# Patient Record
Sex: FEMALE | Race: BLACK OR AFRICAN AMERICAN | Employment: OTHER | URBAN - METROPOLITAN AREA
[De-identification: names, ages, dates, MRNs, and addresses within clinical notes are randomized per-mention and may not be internally consistent; named-entity substitution may affect disease eponyms.]

---

## 2017-07-29 ENCOUNTER — HOSPITAL ENCOUNTER (EMERGENCY)
Age: 67
Discharge: HOME | End: 2017-07-29
Attending: EMERGENCY MEDICINE
Payer: MEDICARE

## 2017-07-29 ENCOUNTER — APPOINTMENT (OUTPATIENT)
Dept: GENERAL RADIOLOGY | Age: 67
End: 2017-07-29
Attending: EMERGENCY MEDICINE
Payer: MEDICARE

## 2017-07-29 VITALS
TEMPERATURE: 98.4 F | BODY MASS INDEX: 39.7 KG/M2 | HEART RATE: 96 BPM | WEIGHT: 247 LBS | SYSTOLIC BLOOD PRESSURE: 149 MMHG | DIASTOLIC BLOOD PRESSURE: 77 MMHG | OXYGEN SATURATION: 99 % | HEIGHT: 66 IN | RESPIRATION RATE: 14 BRPM

## 2017-07-29 DIAGNOSIS — M79.642 LEFT HAND PAIN: Primary | ICD-10-CM

## 2017-07-29 PROCEDURE — 99282 EMERGENCY DEPT VISIT SF MDM: CPT

## 2017-07-29 PROCEDURE — 73110 X-RAY EXAM OF WRIST: CPT

## 2017-07-29 RX ORDER — RANITIDINE 150 MG/1
150 CAPSULE ORAL 2 TIMES DAILY
COMMUNITY

## 2017-07-29 RX ORDER — METHYLDOPA 250 MG/1
250 TABLET, FILM COATED ORAL 3 TIMES DAILY
COMMUNITY

## 2017-07-29 RX ORDER — TRAMADOL HYDROCHLORIDE 50 MG/1
50 TABLET ORAL
COMMUNITY
End: 2019-07-02

## 2017-07-29 NOTE — DISCHARGE INSTRUCTIONS
Hand Pain: Care Instructions  Your Care Instructions  Common causes of hand pain are overuse and injuries, such as might happen during sports or home repair projects. Everyday wear and tear, especially as you get older, also can cause hand pain. Most minor hand injuries will heal on their own, and home treatment is usually all you need to do. If you have sudden and severe pain, you may need tests and treatment. Follow-up care is a key part of your treatment and safety. Be sure to make and go to all appointments, and call your doctor if you are having problems. Its also a good idea to know your test results and keep a list of the medicines you take. How can you care for yourself at home? · Take pain medicines exactly as directed. ¨ If the doctor gave you a prescription medicine for pain, take it as prescribed. ¨ If you are not taking a prescription pain medicine, ask your doctor if you can take an over-the-counter medicine. · Rest and protect your hand. Take a break from any activity that may cause pain. · Put ice or a cold pack on your hand for 10 to 20 minutes at a time. Put a thin cloth between the ice and your skin. · Prop up the sore hand on a pillow when you ice it or anytime you sit or lie down during the next 3 days. Try to keep it above the level of your heart. This will help reduce swelling. · If your doctor recommends a sling, splint, or elastic bandage to support your hand, wear it as directed. When should you call for help? Call 911 anytime you think you may need emergency care. For example, call if:  · Your hand turns cool or pale or changes color. Call your doctor now or seek immediate medical care if:  · You cannot move your hand. · Your hand pops, moves out of its normal position, and then returns to its normal position. · You have signs of infection, such as:  ¨ Increased pain, swelling, warmth, or redness. ¨ Red streaks leading from the sore area.   ¨ Pus draining from a place on your hand. ¨ A fever. · Your hand feels numb or tingly. Watch closely for changes in your health, and be sure to contact your doctor if:  · Your hand feels unstable when you try to use it. · You do not get better as expected. · You have any new symptoms, such as swelling. · Bruises from an injury to your hand last longer than 2 weeks. Where can you learn more? Go to http://sudhakar-dae.info/. Enter R273 in the search box to learn more about \"Hand Pain: Care Instructions. \"  Current as of: March 20, 2017  Content Version: 11.3  © 3399-5300 [employer]. Care instructions adapted under license by [employer] (which disclaims liability or warranty for this information). If you have questions about a medical condition or this instruction, always ask your healthcare professional. [address] any warranty or liability for your use of this information.

## 2017-07-29 NOTE — ED NOTES
Patient able to ambulate independently to a chair from CEU to RW, sitting in a recliner on her phone

## 2017-07-29 NOTE — ED PROVIDER NOTES
HPI Comments: 12:32 PM Hazel Liang is a 69 y.o. female with a hx of diabetes, HTN, COPD, PAD, arthritis, and chronic pain who presents to the ED c/o left wrist pain for the past week. The pain has worsened since onset and now radiates into her hand and up her forearm. She has tried wearing a hand brace with no relief. She is concerned because she thought she could feel a \"knot\" on her wrist. She has seen Dr. Laura Lee, a hand specialist, in the past. She has an appointment with him 08/28/17. She is currently followed by Dr. Ruben Mccoy for her chronic back pain for which she takes Tramadol 50mg. She denies fever, trauma, numbness, and any further complaints. The history is provided by the patient. Past Medical History:   Diagnosis Date    Chronic obstructive pulmonary disease ([address]     Chronic pain     Diabetes ([address]     Hypertension     PAD (peripheral artery disease) ([address]     Sleep apnea        Past Surgical History:   Procedure Laterality Date    HX BREAST LUMPECTOMY Right 2014    HX CATARACT REMOVAL Bilateral 2015    HX HERNIA REPAIR  1998    HX HYSTERECTOMY  1983    [address] [facility]         History reviewed. No pertinent family history. Social History     Social History    Marital status: LEGALLY      Spouse name: N/A    Number of children: N/A    Years of education: N/A     Occupational History    Not on file. Social History Main Topics    Smoking status: Former Smoker     Packs/day: 2.00     Years: 25.00     Quit date: 11/1/1996    Smokeless tobacco: Never Used    Alcohol use No    Drug use: 3.00 per week     Special: Marijuana      Comment: 1989    Sexual activity: Not on file     Other Topics Concern    Not on file     Social History Narrative         ALLERGIES: Review of patient's allergies indicates no known allergies. Review of Systems   Constitutional: Negative for diaphoresis and fever. HENT: Negative for congestion and sore throat.     Eyes: Negative for pain and itching. Respiratory: Negative for cough and shortness of breath. Cardiovascular: Negative for chest pain and palpitations. Gastrointestinal: Negative for abdominal pain and diarrhea. Endocrine: Negative for polydipsia and polyuria. Genitourinary: Negative for dysuria and hematuria. Musculoskeletal: Positive for arthralgias (Left wrist pain). Negative for myalgias. Skin: Negative for rash and wound. Neurological: Negative for seizures and syncope. Hematological: Does not bruise/bleed easily. Psychiatric/Behavioral: Negative for agitation and hallucinations. Vitals:    07/29/17 1233   BP: 149/77   Pulse: 96   Resp: 14   Temp: 98.4 °F (36.9 °C)   SpO2: 99%   Weight: 112 kg (247 lb)   Height: 5' 6\" (1.676 m)            Physical Exam   Constitutional: She appears well-developed and well-nourished. HENT:   Head: Normocephalic and atraumatic. Eyes: Conjunctivae are normal. No scleral icterus. Neck: Normal range of motion. Neck supple. No JVD present. Cardiovascular: Normal rate, regular rhythm and normal heart sounds. 4 intact extremity pulses   Pulmonary/Chest: Effort normal and breath sounds normal.   Abdominal: Soft. Bowel sounds are normal.   Musculoskeletal: Normal range of motion. ventral radial aspect of the left wrist with a 2cm area of swelling, soft. NVI. ROM normal   Lymphadenopathy:     She has no cervical adenopathy. Neurological: She is alert. Skin: Skin is warm and dry. Nursing note and vitals reviewed. MDM  Number of Diagnoses or Management Options    ED Course   Doubt fracture, doubt sprain. No hx of trauma. Possibly ganglion cyst.  Checked , scheduled scripts for tramadol, appears to be in pain management. Discussed with pt, will not prescribe narcotic.      Procedures    Vitals:  Patient Vitals for the past 12 hrs:   Temp Pulse Resp BP SpO2   07/29/17 1233 98.4 °F (36.9 °C) 96 14 149/77 99 %     Pulse ox reviewed and WNL    Medications ordered:   Medications - No data to display      Lab findings:  No results found for this or any previous visit (from the past 12 hour(s)). EKG interpretation by ED Physician:      X-Ray, CT or other radiology findings or impressions:  XR WRIST LT AP/LAT/OBL MIN 3V   Final Result      Impression:     1. No evidence for acute fracture or dislocation. 2. Possible small loose body along the posterior radiocarpal joint. Progress notes, Consult notes or additional Procedure notes:   1:07 PM nurse reported to me that pt slipped off of a rolling stool in the treatment area. I saw and eval, has some right hip pain, bares weight well, no limited ROM, no distress. Don't suspect hip fx or other serious injury from her fall. Did not hit head. No fracture of left wrist. D/w pt    Disposition:  Diagnosis:   1. Left hand pain        Disposition: +home    Follow-up Information     Follow up With Details Comments [facility] MD Cristobal In 2 days  [place] [id number]  [address]  [phone]             Patient's Medications   Start Taking    No medications on file   Continue Taking    AMLODIPINE (NORVASC) 10 MG TABLET        ATORVASTATIN (LIPITOR) 40 MG TABLET        CLOPIDOGREL (PLAVIX) 75 MG TABLET        METFORMIN ER (GLUCOPHAGE XR) 750 MG TABLET        METHYLDOPA (ALDOMET) 250 MG TABLET    Take 250 mg by mouth three (3) times daily. NEXIUM 40 MG CAPSULE        OTHER        PAROXETINE (PAXIL) 10 MG TABLET        PROAIR HFA 90 MCG/ACTUATION INHALER        RANITIDINE  MG CAPSULE    Take 150 mg by mouth two (2) times a day. SPIRIVA WITH HANDIHALER 18 MCG INHALATION CAPSULE        TIZANIDINE (ZANAFLEX) 4 MG TABLET    1 tab po 3 to 4 times daily as needed for back spasm    TRAMADOL (ULTRAM) 50 MG TABLET    Take 50 mg by mouth every six (6) hours as needed for Pain.     ZETIA 10 MG TABLET       These Medications have changed    No medications on file   Stop Taking    CLOTRIMAZOLE (LOTRIMIN) 1 % TOPICAL CREAM        FUROSEMIDE (LASIX) 40 MG TABLET        GABAPENTIN (NEURONTIN) 300 MG CAPSULE        LOSARTAN (COZAAR) 100 MG TABLET        NAPROXEN (NAPROSYN) 375 MG TABLET    Take 1 Tab by mouth two (2) times daily (with meals). For back pain         SCRIBE ATTESTATION STATEMENT  Documented by: Ame Garcia scribing for, and in the presence of, Megan Jones MD 2:48 PM     Signed by: Lalita Al, 07/29/17 2:48 PM     PROVIDER ATTESTATION STATEMENT  I personally performed the services described in the documentation, reviewed the documentation, as recorded by the scribe in my presence, and it accurately and completely records my words and actions.   Megan Jones MD

## 2017-07-29 NOTE — ED NOTES
Per xray tech patient was seated on the stool and slid off, landing on her r hip, no LOC, no dizziness.  Provider made aware    Xray tech will fill out a [employer]

## 2019-07-01 ENCOUNTER — HOSPITAL ENCOUNTER (EMERGENCY)
Age: 69
Discharge: HOME | End: 2019-07-01
Attending: EMERGENCY MEDICINE | Admitting: EMERGENCY MEDICINE
Payer: MEDICARE

## 2019-07-01 VITALS
RESPIRATION RATE: 18 BRPM | DIASTOLIC BLOOD PRESSURE: 91 MMHG | HEART RATE: 83 BPM | TEMPERATURE: 97.5 F | WEIGHT: 260 LBS | OXYGEN SATURATION: 99 % | HEIGHT: 66 IN | BODY MASS INDEX: 41.78 KG/M2 | SYSTOLIC BLOOD PRESSURE: 190 MMHG

## 2019-07-01 DIAGNOSIS — K08.89 TOOTHACHE: Primary | ICD-10-CM

## 2019-07-01 PROCEDURE — 99282 EMERGENCY DEPT VISIT SF MDM: CPT

## 2019-07-01 PROCEDURE — 75810000283 HC INJECTION NERVE BLOCK

## 2019-07-01 PROCEDURE — 74011000250 HC RX REV CODE- 250: Performed by: EMERGENCY MEDICINE

## 2019-07-01 RX ORDER — PENICILLIN V POTASSIUM 500 MG/1
500 TABLET, FILM COATED ORAL 4 TIMES DAILY
Qty: 28 TAB | Refills: 0 | Status: SHIPPED | OUTPATIENT
Start: 2019-07-01 | End: 2019-07-08

## 2019-07-01 RX ORDER — BUPIVACAINE HYDROCHLORIDE 5 MG/ML
5 INJECTION, SOLUTION EPIDURAL; INTRACAUDAL ONCE
Status: COMPLETED | OUTPATIENT
Start: 2019-07-01 | End: 2019-07-01

## 2019-07-01 RX ADMIN — BUPIVACAINE HYDROCHLORIDE 25 MG: 5 INJECTION, SOLUTION EPIDURAL; INTRACAUDAL at 04:36

## 2019-07-01 NOTE — DISCHARGE INSTRUCTIONS
Patient Education        Tooth and Gum Pain: Care Instructions  Your Care Instructions    The most common causes of dental pain are tooth decay and gum disease. Pain can also be caused by an infection of the tooth (abscess) or the gums. Or you may have pain from a broken or cracked tooth. Other causes of pain include infection and damage to a tooth from nervous grinding of your teeth. A wisdom tooth can be painful when it is coming in but cannot break through the gum. It can also be painful when the tooth is only partway in and extra gum tissue has formed around it. The tissue can get inflamed (pericoronitis), and sometimes it gets infected. Prompt dental care can help find the cause of your toothache and keep the tooth from dying or gum disease from getting worse. Self-care at home may reduce your pain and discomfort. Follow-up care is a key part of your treatment and safety. Be sure to make and go to all appointments, and call your dentist or doctor if you are having problems. It's also a good idea to know your test results and keep a list of the medicines you take. How can you care for yourself at home? · To reduce pain and facial swelling, put an ice or cold pack on the outside of your cheek for 10 to 20 minutes at a time. Put a thin cloth between the ice and your skin. Do not use heat. · If your doctor prescribed antibiotics, take them as directed. Do not stop taking them just because you feel better. You need to take the full course of antibiotics. · Ask your doctor if you can take an over-the-counter pain medicine, such as acetaminophen (Tylenol), ibuprofen (Advil, Motrin), or naproxen (Aleve). Be safe with medicines. Read and follow all instructions on the label. · Avoid very hot, cold, or sweet foods and drinks if they increase your pain. · Rinse your mouth with warm salt water every 2 hours to help relieve pain and swelling. Mix 1 teaspoon of salt in 8 ounces of water.   · Talk to your dentist about using special toothpaste for sensitive teeth. To reduce pain on contact with heat or cold or when brushing, brush with this toothpaste regularly or rub a small amount of the paste on the sensitive area with a clean finger 2 or 3 times a day. Floss gently between your teeth. · Do not smoke or use spit tobacco. Tobacco use can make gum problems worse, decreases your ability to fight infection in your gums, and delays healing. If you need help quitting, talk to your doctor about stop-smoking programs and medicines. These can increase your chances of quitting for good. When should you call for help? Call 911 anytime you think you may need emergency care. For example, call if:    · You have trouble breathing.    Call your dentist or doctor now or seek immediate medical care if:    · You have signs of infection, such as:  ? Increased pain, swelling, warmth, or redness. ? Red streaks leading from the area. ? Pus draining from the area. ? A fever.    Watch closely for changes in your health, and be sure to contact your doctor if:    · You do not get better as expected. Where can you learn more? Go to http://sudhakar-dae.info/. Enter 0363 [id number] in the search box to learn more about \"Tooth and Gum Pain: Care Instructions. \"  Current as of: March 27, 2018  Content Version: 11.9  © 2217-8187 [employer]. Care instructions adapted under license by [employer] (which disclaims liability or warranty for this information). If you have questions about a medical condition or this instruction, always ask your healthcare professional. [place] [address] any warranty or liability for your use of this information.

## 2019-07-01 NOTE — ED TRIAGE NOTES
\"my mouth hurts, I can't even sleep\"  Bottom left, back. Had a tooth pulled because it was broken off. Tooth was extracted on 5/30/19.   Pain started becoming constant over this week

## 2019-07-01 NOTE — ED NOTES
I have reviewed discharge instructions with the patient. The patient verbalized understanding. Patient armband removed and shredded    Patient ambulatory to ED Lobby.

## 2019-07-01 NOTE — ED PROVIDER NOTES
67 yo AAF with no relevant PMHx presents with 1 month h/o intermittent left toothache. Pt states that he had cap on one of left bottom teeth, which came off and eventually led to tooth being extracted about 1 month ago. Since that point, pt has had some intermittent pain to area. Pt had been taking ibuprofen and tylenol with some relief but feels like meds are not working anymore. No fevers, no vomiting, no other symptoms. Past Medical History:   Diagnosis Date    [facility] disease [facility]     Chronic pain     Diabetes ([facility]     Hypertension     PAD (peripheral artery disease) ([facility]     Sleep apnea        Past Surgical History:   Procedure Laterality Date    HX BREAST LUMPECTOMY Right 2014    HX CATARACT REMOVAL Bilateral 2015    HX HERNIA REPAIR      HX HYSTERECTOMY      [facility]         History reviewed. No pertinent family history.     Social History     Socioeconomic History    Marital status:      Spouse name: Not on file    Number of children: Not on file    Years of education: Not on file    Highest education level: Not on file   Occupational History    Not on file   Social Needs    Financial resource strain: Not on file    Food insecurity:     Worry: Not on file     Inability: Not on file    Transportation needs:     Medical: Not on file     Non-medical: Not on file   Tobacco Use    Smoking status: Former Smoker     Packs/day: 2.00     Years: 25.00     Pack years: 50.00     Last attempt to quit: 1996     Years since quittin.6    Smokeless tobacco: Never Used   Substance and Sexual Activity    Alcohol use: No    Drug use: Yes     Frequency: 3.0 times per week     Types: Marijuana     Comment:     Sexual activity: Not on file   Lifestyle    Physical activity:     Days per week: Not on file     Minutes per session: Not on file    Stress: Not on file   Relationships    Social connections:     Talks on phone: Not on file     Gets together: Not on file     Attends Protestant service: Not on file     Active member of club or organization: Not on file     Attends meetings of clubs or organizations: Not on file     Relationship status: Not on file    Intimate partner violence:     Fear of current or ex partner: Not on file     Emotionally abused: Not on file     Physically abused: Not on file     Forced sexual activity: Not on file   Other Topics Concern    Not on file   Social History Narrative    Not on file         ALLERGIES: Patient has no known allergies. Review of Systems   Constitutional: Negative for fever. HENT: Positive for dental problem. Negative for trouble swallowing. Respiratory: Negative for shortness of breath. Cardiovascular: Negative for chest pain. Gastrointestinal: Negative for abdominal pain, diarrhea and vomiting. Genitourinary: Negative for difficulty urinating. Musculoskeletal: Negative for back pain and neck pain. Skin: Negative for wound. Neurological: Negative for syncope. Psychiatric/Behavioral: Negative for behavioral problems. All other systems reviewed and are negative. Vitals:    07/01/19 0331   BP: (!) 190/91   Pulse: 83   Resp: 18   Temp: 97.5 °F (36.4 °C)   SpO2: 99%   Weight: 117.9 kg (260 lb)   Height: 5' 6\" (1.676 m)            Physical Exam   Constitutional: She is oriented to person, place, and time. She appears well-developed. No distress. well-appearing, nad   HENT:   Head: Normocephalic and atraumatic. Missing teeth, no apparent abscess, no drooling, no trismus   Eyes: EOM are normal.   Neck: Normal range of motion. Cardiovascular: Normal rate. Pulmonary/Chest: Effort normal and breath sounds normal. No respiratory distress. Abdominal: Soft. There is no tenderness. Musculoskeletal: Normal range of motion. Mechanically stable   Neurological: She is alert and oriented to person, place, and time. No focal deficits noted   Skin: Skin is warm. Psychiatric: Her behavior is normal.   Nursing note and vitals reviewed. MDM  Number of Diagnoses or Management Options  Toothache:   Diagnosis management comments: 69 yo AAF with no relevant PMHx presents with 1 month h/o toothache to lower left side. No other symptoms or complaints. Examination with missing teeth but otherwise unremarkable with no visible abscess, no drooling, no trismus. I do not suspect emergent medical condition clinically. Dental block in ED, dc home, trial of abx, follow-up, return precautions. Patient Progress  Patient progress: stable         Nerve Block  Date/Time: 7/1/2019 4:31 AM  Performed by: Angella Escobar MD  Authorized by: Angella Escobar MD     Consent:     Consent obtained:  Verbal    Consent given by:  Patient    Risks discussed:  Unsuccessful block and pain    Alternatives discussed:  No treatment and alternative treatment  Indications:     Indications:  Pain relief  Location:     Body area:  Head    Head nerve blocked: left inferior alveolar. Laterality:  Left  Procedure details (see MAR for exact dosages): Block needle gauge:  25 G    Anesthetic injected:  Bupivacaine 0.5% w/o epi    Injection procedure:  Anatomic landmarks identified, anatomic landmarks palpated, introduced needle, incremental injection and negative aspiration for blood    Paresthesia:  None  Post-procedure details:     Outcome:  Pain improved    Patient tolerance of procedure: Tolerated well, no immediate complications        PROGRESS NOTES    3:45 AM:   Angella Escobar MD arrives to the bedside to evaluate the patient. Answered the patient's questions regarding the treatment plan.       CONSULTATIONS  None      MEDICATIONS ORDERED  Medications   bupivacaine (PF) (MARCAINE) 0.5 % (5 mg/mL) injection 25 mg (has no administration in time range)       RADIOLOGY INTERPRETATIONS  No orders to display       EKG READINGS/LABORATORY RESULTS  No results found for this or any previous visit (from the past 12 hour(s)). ED DIAGNOSIS & DISPOSITION INFORMATION  Diagnosis:   1. Toothache        Disposition: Discharged    Follow-up Information     Follow up With Specialties Details Why Contact Info    [address]  Schedule an appointment as soon as possible for a visit  [address]  [phone]    [facility] EMERGENCY DEPT Emergency Medicine  As needed [address]  [phone]          Patient's Medications   Start Taking    PENICILLIN V POTASSIUM (VEETID) 500 MG TABLET    Take 1 Tab by mouth four (4) times daily for 7 days. Continue Taking    AMLODIPINE (NORVASC) 10 MG TABLET        ATORVASTATIN (LIPITOR) 40 MG TABLET        CLOPIDOGREL (PLAVIX) 75 MG TABLET        METFORMIN ER (GLUCOPHAGE XR) 750 MG TABLET        METHYLDOPA (ALDOMET) 250 MG TABLET    Take 250 mg by mouth three (3) times daily. NEXIUM 40 MG CAPSULE        OTHER        PAROXETINE (PAXIL) 10 MG TABLET        PROAIR HFA 90 MCG/ACTUATION INHALER        RANITIDINE  MG CAPSULE    Take 150 mg by mouth two (2) times a day. SPIRIVA WITH HANDIHALER 18 MCG INHALATION CAPSULE        TIZANIDINE (ZANAFLEX) 4 MG TABLET    1 tab po 3 to 4 times daily as needed for back spasm    TRAMADOL (ULTRAM) 50 MG TABLET    Take 50 mg by mouth every six (6) hours as needed for Pain.     ZETIA 10 MG TABLET       These Medications have changed    No medications on file   Stop Taking    No medications on file           Zoila Bone MD.

## 2019-07-02 ENCOUNTER — HOSPITAL ENCOUNTER (EMERGENCY)
Age: 69
Discharge: HOME | End: 2019-07-02
Attending: EMERGENCY MEDICINE
Payer: MEDICARE

## 2019-07-02 VITALS
DIASTOLIC BLOOD PRESSURE: 82 MMHG | BODY MASS INDEX: 38.57 KG/M2 | OXYGEN SATURATION: 99 % | TEMPERATURE: 99.2 F | WEIGHT: 240 LBS | HEART RATE: 81 BPM | HEIGHT: 66 IN | RESPIRATION RATE: 17 BRPM | SYSTOLIC BLOOD PRESSURE: 152 MMHG

## 2019-07-02 DIAGNOSIS — K02.9 PAIN DUE TO DENTAL CARIES: Primary | ICD-10-CM

## 2019-07-02 PROCEDURE — 99282 EMERGENCY DEPT VISIT SF MDM: CPT

## 2019-07-02 RX ORDER — TRAMADOL HYDROCHLORIDE 50 MG/1
50 TABLET ORAL
Qty: 10 TAB | Refills: 0 | Status: SHIPPED | OUTPATIENT
Start: 2019-07-02 | End: 2019-07-05

## 2019-07-02 NOTE — DISCHARGE INSTRUCTIONS
Patient Education        Tooth Decay: Care Instructions  Your Care Instructions    Tooth decay is damage to a tooth caused by plaque. Plaque is a thin film of bacteria that sticks to the teeth above and below the gum line. If plaque isn't removed from the teeth, it can build up and harden into tartar. The bacteria in plaque and tartar use sugars in food to make acids. These acids can cause tooth decay and gum disease. Any part of your tooth can decay, from the roots below the gum line to the chewing surface. Decay can affect the outer layer (enamel) or inner layer (dentin) of your teeth. The deeper the decay, the worse the damage. Untreated tooth decay will get worse and may lead to tooth loss. If you have a small hole (cavity) in your tooth, your dentist can repair it by removing the decay and filling the hole. If you have deeper decay, you may need more treatment. A very badly damaged tooth may have to be removed. Follow-up care is a key part of your treatment and safety. Be sure to make and go to all appointments, and call your dentist if you are having problems. It's also a good idea to know your test results and keep a list of the medicines you take. How can you care for yourself at home? If you have pain:  · Take an over-the-counter pain medicine, such as acetaminophen (Tylenol), ibuprofen (Advil, Motrin), or naproxen (Aleve). Be safe with medicines. Read and follow all instructions on the label. ? Do not take two or more pain medicines at the same time unless the doctor told you to. Many pain medicines have acetaminophen, which is Tylenol. Too much acetaminophen (Tylenol) can be harmful. · Put ice or a cold pack on your cheek over the tooth for 10 to 15 minutes at a time. Put a thin cloth between the ice and your skin. To prevent tooth decay  · Brush teeth twice a day, and floss once a day. Brushing with fluoride toothpaste and flossing may be enough to reverse early decay.   · Use a toothbrush with soft, rounded-end bristles and a head that is small enough to reach all parts of your teeth and mouth. Replace your toothbrush every 3 or 4 months. You may also use an electric toothbrush that has rotating and oscillating (back-and-forth) action. · Ask your dentist about having fluoride treatments at the dental office. · Brush your tongue to help get rid of bacteria. · Eat healthy foods that include whole grains, vegetables, and fruits. · Have your teeth cleaned by a professional at least two times a year. · Do not smoke or use smokeless tobacco. Tobacco can make tooth decay worse. When should you call for help? Call 911 anytime you think you may need emergency care. For example, call if:    · You have trouble breathing.    Call your dentist now or seek immediate medical care if:    · You have new or worse symptoms of infection, such as:  ? Increased pain, swelling, warmth, or redness. ? Red streaks leading from the area. ? Pus draining from the area. ? A fever.    Watch closely for changes in your health, and be sure to contact your doctor if:    · You do not get better as expected. Where can you learn more? Go to http://sudhakar-dae.info/. Enter Q335 in the search box to learn more about \"Tooth Decay: Care Instructions. \"  Current as of: March 27, 2018  Content Version: 11.9  © 8694-9344 "[employer]. Care instructions adapted under license by [employer] (which disclaims liability or warranty for this information). If you have questions about a medical condition or this instruction, always ask your healthcare professional. [place] [address] any warranty or liability for your use of this information.

## 2019-07-02 NOTE — ED PROVIDER NOTES
EMERGENCY DEPARTMENT HISTORY AND PHYSICAL EXAM    3:33 PM      Date: 7/2/2019  Patient Name: Amaya Maldonado    History of Presenting Illness     Chief Complaint   Patient presents with    Dental Pain         History Provided By: Patient    Chief Complaint: dental pain       Additional History (Context): Amaya Maldonado is a 68 y.o. female who presents with dental pain. Left lower jaw dental pain that started 1 month ago and has been intermittent, worse this week. She went to the ER yesterday had dental block but was not prescribed any pain medication. She has been taking the penicillin for 1 day. She did follow-up with [facility] but they do not have an appointment until next week. She cannot afford to go to another dentist.  Denies fevers, throat swelling or trouble breathing peer pain is currently 9 out of 10 and she has been taking Tylenol for the pain. PCP: UNKNOWN    Current Outpatient Medications   Medication Sig Dispense Refill    traMADol (ULTRAM) 50 mg tablet Take 1 Tab by mouth every six (6) hours as needed for Pain for up to 3 days. Max Daily Amount: 200 mg. 10 Tab 0    penicillin v potassium (VEETID) 500 mg tablet Take 1 Tab by mouth four (4) times daily for 7 days. 28 Tab 0    raNITIdine hcl 150 mg capsule Take 150 mg by mouth two (2) times a day.  methyldopa (ALDOMET) 250 mg tablet Take 250 mg by mouth three (3) times daily.       OTHER       tiZANidine (ZANAFLEX) 4 mg tablet 1 tab po 3 to 4 times daily as needed for back spasm 30 Tab 0    PROAIR HFA 90 mcg/actuation inhaler   3    amLODIPine (NORVASC) 10 mg tablet   1    atorvastatin (LIPITOR) 40 mg tablet   1    clopidogrel (PLAVIX) 75 mg tablet   1    NEXIUM 40 mg capsule   3    ZETIA 10 mg tablet   1    metFORMIN ER (GLUCOPHAGE XR) 750 mg tablet   1    PARoxetine (PAXIL) 10 mg tablet   1    SPIRIVA WITH HANDIHALER 18 mcg inhalation capsule   1       Past History     Past Medical History:  Past Medical History: Diagnosis Date    [facility]     Chronic pain     Diabetes ([facility]     Hypertension     [facility]     Sleep apnea        Past Surgical History:  Past Surgical History:   Procedure Laterality Date    HX BREAST LUMPECTOMY Right 2014    HX CATARACT REMOVAL Bilateral 2015    HX HERNIA REPAIR  1998    HX HYSTERECTOMY      HX TUBAL LIGATION  1985       Family History:  No family history on file. Social History:  Social History     Tobacco Use    Smoking status: Former Smoker     Packs/day: 2.00     Years: 25.00     Pack years: 50.00     Last attempt to quit: 1996     Years since quittin.6    Smokeless tobacco: Never Used   Substance Use Topics    Alcohol use: No    Drug use: Yes     Frequency: 3.0 times per week     Types: Marijuana     Comment:        Allergies:  No Known Allergies      Review of Systems       Review of Systems   Constitutional: Negative for fever. HENT: Positive for dental problem. Negative for facial swelling. Eyes: Negative for visual disturbance. Respiratory: Negative for shortness of breath. Cardiovascular: Negative for chest pain. Gastrointestinal: Negative for abdominal pain. Genitourinary: Negative for dysuria. Musculoskeletal: Negative for neck pain. Skin: Negative for rash. Neurological: Negative for dizziness. Psychiatric/Behavioral: Negative for confusion. All other systems reviewed and are negative. Physical Exam     Visit Vitals  /82 (BP 1 Location: Right arm, BP Patient Position: At rest)   Pulse 81   Temp 99.2 °F (37.3 °C)   Resp 17   Ht 5' 6\" (1.676 m)   Wt 108.9 kg (240 lb)   SpO2 99%   BMI 38.74 kg/m²         Physical Exam   Constitutional: She is oriented to person, place, and time. She appears well-developed and well-nourished. No distress. HENT:   Head: Normocephalic and atraumatic. Decay of tooth #18, prior extraction tooth #19.   No gingival swelling or palpable fluctuance to suggest abscess. Airway patent without edema. Eyes: Conjunctivae are normal.   Neck: Normal range of motion. Cardiovascular: Normal rate and regular rhythm. Pulmonary/Chest: Effort normal.   Abdominal: She exhibits no distension. Musculoskeletal: Normal range of motion. Neurological: She is alert and oriented to person, place, and time. Skin: Skin is warm and dry. She is not diaphoretic. Psychiatric: She has a normal mood and affect. Nursing note and vitals reviewed. Diagnostic Study Results     Labs -  No results found for this or any previous visit (from the past 12 hour(s)). Radiologic Studies -   No orders to display         Medical Decision Making   I am the first provider for this patient. I reviewed the vital signs, available nursing notes, past medical history, past surgical history, family history and social history. Vital Signs-Reviewed the patient's vital signs. Records Reviewed: Nursing Notes (Time of Review: 3:33 PM)    ED Course: Progress Notes, Reevaluation, and Consults:      Provider Notes (Medical Decision Making): MDM  Number of Diagnoses or Management Options  Pain due to dental caries:   Diagnosis management comments: Pain to tooth #19 with obvious decay, no signs of abscess. Discussed treatment plan, return precautions, symptomatic relief, and expected time to improvement. All questions answered. Patient is stable for discharge and outpatient management. Diagnosis     Clinical Impression:   1.  Pain due to dental caries        Disposition: Discharged      Follow-up Information     Follow up With Specialties Details Why Contact Info    [address] [facility],[place] [address]  Schedule an appointment as soon as possible for a visit  [address]  [phone]    [facility] EMERGENCY DEPT Emergency Medicine  Immediately if symptoms worsen [address]  [phone] Patient's Medications   Start Taking    TRAMADOL (ULTRAM) 50 MG TABLET    Take 1 Tab by mouth every six (6) hours as needed for Pain for up to 3 days. Max Daily Amount: 200 mg. Continue Taking    AMLODIPINE (NORVASC) 10 MG TABLET        ATORVASTATIN (LIPITOR) 40 MG TABLET        CLOPIDOGREL (PLAVIX) 75 MG TABLET        METFORMIN ER (GLUCOPHAGE XR) 750 MG TABLET        METHYLDOPA (ALDOMET) 250 MG TABLET    Take 250 mg by mouth three (3) times daily. NEXIUM 40 MG CAPSULE        OTHER        PAROXETINE (PAXIL) 10 MG TABLET        PENICILLIN V POTASSIUM (VEETID) 500 MG TABLET    Take 1 Tab by mouth four (4) times daily for 7 days. PROAIR HFA 90 MCG/ACTUATION INHALER        RANITIDINE  MG CAPSULE    Take 150 mg by mouth two (2) times a day. SPIRIVA WITH HANDIHALER 18 MCG INHALATION CAPSULE        TIZANIDINE (ZANAFLEX) 4 MG TABLET    1 tab po 3 to 4 times daily as needed for back spasm    ZETIA 10 MG TABLET       These Medications have changed    No medications on file   Stop Taking    TRAMADOL (ULTRAM) 50 MG TABLET    Take 50 mg by mouth every six (6) hours as needed for Pain.     _______________________________    Attestations:  Scribe Attestation     Joesph ZANDRA Dumas PA-C acting as a scribe for and in the presence of ESTELA Dixon      July 02, 2019 at 3:37 PM       Provider Attestation:      I personally performed the services described in the documentation, reviewed the documentation, as recorded by the scribe in my presence, and it accurately and completely records my words and actions.  July 02, 2019 at 3:37 PM - ESTELA Dixon  _______________________________

## 2024-04-23 NOTE — ED TRIAGE NOTES
Continuity of Care Form    Patient Name: Bobbi Phillips   :  1956  MRN:  [id number]    Admit date:  4/3/2024  Discharge date:  2024    Code Status Order: Full Code   Advance Directives:   Advance Care Flowsheet Documentation       Date/Time Healthcare Directive Type of Healthcare Directive Copy in Chart Healthcare Agent Appointed Healthcare Agent's Name Healthcare Agent's Phone Number    04/10/24 1111 No, patient does not have an advance directive for healthcare treatment -- -- -- -- --            Admitting Physician:  Courtney Carrillo MD  PCP: Stacie Doty MD    Discharging Nurse: EMILY John  Discharging Hospital Unit/Room#:   Discharging Unit Phone Number: [phone]    Emergency Contact:   Extended Emergency Contact Information  Primary Emergency Contact: Judit Gar *HIPAA*  Address:   Home Phone: [phone]  Work Phone: [phone]  Mobile Phone: [phone]  Relation: Brother/Sister  Secondary Emergency Contact: Phillip Lofton *HIPAA*  Home Phone: [phone]  Work Phone: [phone]  Mobile Phone: [phone]  Relation: Child    Past Surgical History:  Past Surgical History:   Procedure Laterality Date    ANKLE FRACTURE SURGERY Left 2006    no retained metal    CARDIAC CATHETERIZATION  2006    CATARACT REMOVAL WITH IMPLANT Bilateral 2018     SECTION      x2, 1988,  \"SADDLE BLOCK\"    CHOLECYSTECTOMY, LAPAROSCOPIC  2012    COLONOSCOPY N/A 2023    COLONOSCOPY WITH BIOPSY performed by Carlos Campos MD at [facility] OR    DILATION AND CURETTAGE OF UTERUS N/A 10/02/2023    ENDOMETRIAL BIOPSY AND EXAM UNDER ANESTHESIA  Failed D&C Hysteroscopy performed by Saud Hardy DO at [facility] OR    DILATION AND CURETTAGE OF UTERUS N/A 2024    EUA, DIAGNOSTIC HYSTEROSCOPY performed by Mena Horn MD at [facility] OR    HYSTEROSCOPY  2024    EUA, DIAGNOSTIC HYSTEROSCOPY    UPPER GASTROINTESTINAL ENDOSCOPY N/A 4/10/2024    ESOPHAGOGASTRODUODENOSCOPY  L wrist and forearm pain, onset one week, worse today, denies recent injury/trauma CHRONULAC  Take 45 mLs by mouth every 8 hours as needed (constpation/ needs to have 2-3 BMs daily)   lidocaine 4 % external patch  Place 1 patch onto the skin daily for 5 days  Start taking on: April 24, 2024   melatonin 3 MG Tabs tablet  Take 2 tablets by mouth nightly as needed (insomnia)   metoprolol tartrate 25 MG tablet  Commonly known as: LOPRESSOR  Take 1 tablet by mouth 2 times daily   miconazole 2 % powder  Commonly known as: MICOTIN  Apply topically 2 times daily.   oxyCODONE 5 MG immediate release tablet  Commonly known as: ROXICODONE  Take 1 tablet by mouth every 6 hours as needed for Pain for up to 3 days. Max Daily Amount: 20 mg   pantoprazole 40 MG tablet  Commonly known as: PROTONIX  Take 1 tablet by mouth 2 times daily (before meals)   polyethylene glycol 17 g packet  Commonly known as: GLYCOLAX  Take 1 packet by mouth daily as needed for Constipation   rifAXIMin 550 MG tablet  Commonly known as: XIFAXAN  Take 1 tablet by mouth 2 times daily   sulfamethoxazole-trimethoprim 800-160 MG per tablet  Commonly known as: BACTRIM DS;SEPTRA DS  Take 1 tablet by mouth daily   tamsulosin 0.4 MG capsule  Commonly known as: FLOMAX  Take 1 capsule by mouth daily  Start taking on: April 24, 2024   zinc sulfate 220 (50 Zn)  mg capsule - elemental zinc  Commonly known as: ZINCATE  Take 1 capsule by mouth daily  Start taking on: April 24, 2024     CHANGE how you take these medications    CHANGE how you take these medications  gabapentin 100 MG capsule  Commonly known as: NEURONTIN  Take 1 capsule by mouth 3 times daily for 10 days.  What changed:  medication strength  See the new instructions.     CONTINUE taking these medications    CONTINUE taking these medications  aspirin 81 MG EC tablet  Take 1 tablet by mouth daily   atorvastatin 10 MG tablet  Commonly known as: LIPITOR  take 1 tablet by mouth once daily   B-D UF III MINI PEN NEEDLES 31G X 5 MM Misc  Generic drug: Insulin Pen Needle  use 1 PEN NEEDLE to